# Patient Record
Sex: FEMALE | Race: BLACK OR AFRICAN AMERICAN | NOT HISPANIC OR LATINO | Employment: FULL TIME | URBAN - METROPOLITAN AREA
[De-identification: names, ages, dates, MRNs, and addresses within clinical notes are randomized per-mention and may not be internally consistent; named-entity substitution may affect disease eponyms.]

---

## 2018-01-24 ENCOUNTER — OFFICE VISIT (OUTPATIENT)
Dept: BARIATRICS | Facility: CLINIC | Age: 23
End: 2018-01-24
Payer: COMMERCIAL

## 2018-01-24 VITALS
TEMPERATURE: 99 F | SYSTOLIC BLOOD PRESSURE: 116 MMHG | HEART RATE: 84 BPM | HEIGHT: 63 IN | BODY MASS INDEX: 33.01 KG/M2 | RESPIRATION RATE: 16 BRPM | WEIGHT: 186.31 LBS | DIASTOLIC BLOOD PRESSURE: 68 MMHG

## 2018-01-24 DIAGNOSIS — E66.9 OBESITY (BMI 30-39.9): Primary | ICD-10-CM

## 2018-01-24 PROCEDURE — 99204 OFFICE O/P NEW MOD 45 MIN: CPT | Mod: S$GLB,,, | Performed by: SURGERY

## 2018-01-24 PROCEDURE — 99999 PR PBB SHADOW E&M-NEW PATIENT-LVL IV: CPT | Mod: PBBFAC,,, | Performed by: SURGERY

## 2018-01-24 NOTE — PROGRESS NOTES
Initial Consult    Chief Complaint   Patient presents with    Obesity       History of Present Illness:  Patient is a 22 y.o. female who is referred for evaluation of surgical treatment of morbid obesity. Her Body mass index is 33 kg/m². She has known comorbidities of none. She has attended the bariatric seminar and is most interested in gastric sleeve surgery.      Past attempts at weight loss include: Unsupervised: atkins, calori counting, gym membership, high protein, low carbohydrates, slim fast;  Supervised:  Exercise counseling, nutri-system, , weight watchers;  Diet pills: treadmill, weight training, team sports;  Exercise attempts: support groups    Weight history:   At current weight:  185  Obese for 1 year.  More than 35 pounds overweight for 1 year.  Started dieting at 17 years old.  Maximum weight reached: 192 pounds  Most weight lost was 25 pounds through eating less, more active for 6 months.  She describes Her eating habits as snacker/grazer.    JERALD screening: sleeps well    Reflux screening: complains of reflux and heartburn with certain foods    Review of patient's allergies indicates:  No Known Allergies    No current outpatient prescriptions on file.     No current facility-administered medications for this visit.        History reviewed. No pertinent past medical history.  History reviewed. No pertinent surgical history.  Family History   Problem Relation Age of Onset    Obesity Mother     Hypertension Mother     Hypertension Father     Obesity Father     No Known Problems Sister     No Known Problems Brother     No Known Problems Maternal Grandmother     No Known Problems Maternal Grandfather     No Known Problems Brother     No Known Problems Sister      Social History   Substance Use Topics    Smoking status: Never Smoker    Smokeless tobacco: Never Used    Alcohol use Yes      Comment: occasionally        Review of Systems:  Review of Systems   Constitutional:  "Positive for fever. Negative for activity change, appetite change and unexpected weight change.   HENT: Positive for congestion and rhinorrhea. Negative for sinus pressure, sneezing, sore throat, tinnitus and voice change.    Eyes: Negative for pain, redness and itching.   Respiratory: Positive for cough. Negative for apnea, choking, chest tightness, shortness of breath, wheezing and stridor.    Cardiovascular: Negative for chest pain, palpitations and leg swelling.   Gastrointestinal: Positive for diarrhea. Negative for abdominal distention, abdominal pain, anal bleeding, blood in stool, constipation, nausea, rectal pain and vomiting.   Endocrine: Negative for cold intolerance and heat intolerance.   Genitourinary: Negative for difficulty urinating and dysuria.   Musculoskeletal: Positive for arthralgias and back pain (worse with recent illness). Negative for gait problem.   Skin: Negative for rash and wound.   Allergic/Immunologic: Negative for environmental allergies and food allergies.   Neurological: Positive for dizziness and light-headedness. Negative for headaches.   Hematological: Negative for adenopathy. Does not bruise/bleed easily.   Psychiatric/Behavioral: Negative for agitation and confusion.       Physical:     Vital Signs (Most Recent)  Temp: 99.2 °F (37.3 °C) (01/24/18 0905)  Pulse: 84 (01/24/18 0905)  Resp: 16 (01/24/18 0905)  BP: 116/68 (01/24/18 0905)  5' 3" (1.6 m)  84.5 kg (186 lb 4.6 oz)     Body comp:  Fat Percent:  41.9 %  Fat Mass:  76.6 lb  FFM:  106.2 lb  TBW: 76.6 lb  TBW %:  41.9 %  BMR: 1549 kcal          Physical Exam:  Physical Exam   Constitutional: She is oriented to person, place, and time. She appears well-developed and well-nourished. No distress.   HENT:   Head: Normocephalic and atraumatic.   Mouth/Throat: No oropharyngeal exudate.   Eyes: Conjunctivae and EOM are normal. Pupils are equal, round, and reactive to light. No scleral icterus.   Neck: Normal range of motion. Neck " supple. No JVD present. No tracheal deviation present. No thyromegaly present.   Cardiovascular: Normal rate, regular rhythm and normal heart sounds.  Exam reveals no gallop and no friction rub.    No murmur heard.  Pulmonary/Chest: Effort normal and breath sounds normal. No stridor. No respiratory distress. She has no wheezes. She has no rales. She exhibits no tenderness.   Abdominal: Soft. Bowel sounds are normal. She exhibits no distension and no mass. There is no tenderness. There is no rebound and no guarding.   Musculoskeletal: Normal range of motion. She exhibits no edema or tenderness.   Lymphadenopathy:     She has no cervical adenopathy.   Neurological: She is alert and oriented to person, place, and time. No cranial nerve deficit.   Skin: Skin is warm and dry. No rash noted. She is not diaphoretic. No erythema.   Psychiatric: She has a normal mood and affect. Her behavior is normal.   Nursing note and vitals reviewed.      ASSESSMENT/PLAN:        1. Obesity (BMI 30-39.9)  BMP    CBC w/ Auto Differential    Folate Serum    H. Pylori Antibody, IGG    Hg A1c    Hepatic Panel    Iron & TIBC    Lipid Profile    Magnesium    Phosphorus    T3    T4    TSH    Free T4    Vitamin B12    Vitamin B1    Vitamin D 25 Hydroxy    Ambulatory consult to Nutrition Services    Ambulatory consult to Psychology       Plan:  Risa Gordon has obesity as their Body mass index is 33 kg/m².   She does not qualify for surgery at this time.  We talked about dieting and following a high protein low carbohydrate diet.  She will return if she qualifies or to try dieting.    She will need:    Labs  EKG  dietary consult    Diet plan: high protein low carb- mainly meats and vegetables  Exercise plan: Cardiovascular exercise, get HR over 100 for 20 minutes 3 times per week.  Start multivitamin

## 2018-01-25 DIAGNOSIS — E66.9 OBESITY (BMI 30-39.9): Primary | ICD-10-CM

## 2018-03-01 ENCOUNTER — TELEPHONE (OUTPATIENT)
Dept: BARIATRICS | Facility: CLINIC | Age: 23
End: 2018-03-01

## 2021-04-26 ENCOUNTER — PATIENT MESSAGE (OUTPATIENT)
Dept: RESEARCH | Facility: HOSPITAL | Age: 26
End: 2021-04-26

## 2021-07-21 ENCOUNTER — TELEPHONE (OUTPATIENT)
Dept: BARIATRICS | Facility: CLINIC | Age: 26
End: 2021-07-21